# Patient Record
Sex: FEMALE
[De-identification: names, ages, dates, MRNs, and addresses within clinical notes are randomized per-mention and may not be internally consistent; named-entity substitution may affect disease eponyms.]

---

## 2019-03-12 ENCOUNTER — HOSPITAL ENCOUNTER (OUTPATIENT)
Dept: HOSPITAL 75 - RAD | Age: 39
End: 2019-03-12
Attending: PEDIATRICS
Payer: COMMERCIAL

## 2019-03-12 DIAGNOSIS — K57.30: ICD-10-CM

## 2019-03-12 DIAGNOSIS — K76.0: ICD-10-CM

## 2019-03-12 DIAGNOSIS — E11.65: Primary | ICD-10-CM

## 2019-03-12 LAB
ALBUMIN SERPL-MCNC: 4.3 GM/DL (ref 3.2–4.5)
ALP SERPL-CCNC: 96 U/L (ref 40–136)
ALT SERPL-CCNC: 61 U/L (ref 0–55)
BILIRUB SERPL-MCNC: 0.5 MG/DL (ref 0.1–1)
BUN/CREAT SERPL: 14
CALCIUM SERPL-MCNC: 9.2 MG/DL (ref 8.5–10.1)
CHLORIDE SERPL-SCNC: 103 MMOL/L (ref 98–107)
CO2 SERPL-SCNC: 21 MMOL/L (ref 21–32)
CREAT SERPL-MCNC: 0.87 MG/DL (ref 0.6–1.3)
GFR SERPLBLD BASED ON 1.73 SQ M-ARVRAT: > 60 ML/MIN
GLUCOSE SERPL-MCNC: 374 MG/DL (ref 70–105)
POTASSIUM SERPL-SCNC: 4 MMOL/L (ref 3.6–5)
PROT SERPL-MCNC: 7.4 GM/DL (ref 6.4–8.2)
SODIUM SERPL-SCNC: 136 MMOL/L (ref 135–145)

## 2019-03-12 PROCEDURE — 83036 HEMOGLOBIN GLYCOSYLATED A1C: CPT

## 2019-03-12 PROCEDURE — 36415 COLL VENOUS BLD VENIPUNCTURE: CPT

## 2019-03-12 PROCEDURE — 74177 CT ABD & PELVIS W/CONTRAST: CPT

## 2019-03-12 PROCEDURE — 80053 COMPREHEN METABOLIC PANEL: CPT

## 2019-03-12 NOTE — DIAGNOSTIC IMAGING REPORT
PROCEDURE: CT abdomen and pelvis with contrast.



TECHNIQUE: Multiple contiguous axial images were obtained through

the abdomen and pelvis after administration of intravenous

contrast. 



INDICATION: Right lower quadrant abdominal pain. 



COMPARISON: None.



FINDINGS: Diffuse fatty infiltration of the liver. The

gallbladder, pancreas, spleen, adrenals, kidneys, collecting

systems and bladder are negative. Heterogeneous low-attenuation

regions in the cervix and the cervical os are indeterminate.

Normal appendix. Advanced colonic diverticulosis. No evidence of

active diverticulitis. No free intraperitoneal air or fluid. No

lymphadenopathy. No evidence of bowel obstruction. Advanced

degenerative endplate changes at L4-L5. No acute osseous

findings.



IMPRESSION: 

1. No acute CT findings in the abdomen or pelvis.

2. Indeterminate low-attenuation heterogeneous regions in the

cervix and cervical os. Recommend further characterization.

3. Advanced colonic diverticulosis without evidence of active

diverticulitis.

4. Hepatic steatosis.



Dictated by: 



  Dictated on workstation # [id number]

## 2019-03-15 ENCOUNTER — HOSPITAL ENCOUNTER (OUTPATIENT)
Dept: HOSPITAL 75 - RAD | Age: 39
End: 2019-03-15
Attending: PEDIATRICS
Payer: COMMERCIAL

## 2019-03-15 DIAGNOSIS — N85.8: Primary | ICD-10-CM

## 2019-03-15 DIAGNOSIS — R10.31: ICD-10-CM

## 2019-03-15 PROCEDURE — 76830 TRANSVAGINAL US NON-OB: CPT

## 2019-03-15 PROCEDURE — 76856 US EXAM PELVIC COMPLETE: CPT

## 2019-03-15 NOTE — DIAGNOSTIC IMAGING REPORT
PROCEDURE: US Non-ob pelvis comp/trans.



TECHNIQUE:  Multiple realtime grayscale images were obtained of

the pelvis in various projections endovaginally.



Transabdominal imaging was also performed.



INDICATION: Right lower quadrant abdominal pain.



FINDINGS: Uterus measures 8.5 x 4.1 x 3.7 cm. There are multiple

cervical nabothian cysts, largest approximately 15 mm in size.

Endometrium is thickened up to 16 mm and does show some cystic

changes. No myometrial mass is identified. The right ovary

measures 3.0 x 2.8 x 3.0 cm and the left ovary measures 3.3 x 2.3

x 2.7 cm. There is blood flow to both ovaries. No adnexal mass or

free fluid is seen.



IMPRESSION: Endometrial thickening up to 16 mm with cystic

changes. No other significant abnormality is seen.



Dictated by: 



  Dictated on workstation # [id number]